# Patient Record
Sex: FEMALE | Race: BLACK OR AFRICAN AMERICAN | NOT HISPANIC OR LATINO | URBAN - METROPOLITAN AREA
[De-identification: names, ages, dates, MRNs, and addresses within clinical notes are randomized per-mention and may not be internally consistent; named-entity substitution may affect disease eponyms.]

---

## 2021-04-19 RX ORDER — CHLORHEXIDINE GLUCONATE 213 G/1000ML
1 SOLUTION TOPICAL ONCE
Refills: 0 | Status: DISCONTINUED | OUTPATIENT
Start: 2021-04-21 | End: 2021-05-06

## 2021-04-19 NOTE — H&P ADULT - ASSESSMENT
Risks & benefits of procedure and alternative therapy have been explained to the patient including but not limited to: allergic reaction, bleeding w/possible need for blood transfusion, infection, renal and vascular compromise, limb damage, arrhythmia, stroke, vessel dissection/perforation, Myocardial infarction, emergent CABG. Informed consent obtained and in chart.     Pt is 80yo F w/ PMHx HTN, HLD, hx TIA (R eye drooping, no other residual deficits), Sick sinus syndrome (s/p PPM placement 12/2020 w/ Dr. Ozzie Kong; [employer]), PAD (severe tibial disease per MD note from 3/2021 – plan to investigate further in future after cardiac angiogram) who presented to her cardiologist, Dr. Dasha Garcia, endorsing substernal, non-radiating, aching CP that is exertional in nature, not at rest, exacerbated by moderate physical exertion. She also has B/L LE claudication symptoms w/ walking 2-3 blocks or 1-2 flights of stairs. NST (10/26/20): fixed moderate perfusion abnormality of moderate intensity in inferior region; Reversible moderate perfusion abnormality of moderate intensity in apical region. ECHO (10/3/20): LVEF 63%, mild MR, mild TR. Pt no presents for cardiac cath.    Denies bleeding, hematuria, hematochezia, melena. Aspirin 325mg and Plavix 600mg ordered pre-cath.   Euvolemic, normal EF. NS @ 75cc/hour x 4h ordered pre-cath.   NO ESCORT. NO SEDATION    Mallampati Class IV  ASA Class II  Pt is a suitable candidate for moderate sedation.   Risks & benefits of procedure and alternative therapy have been explained to the patient including but not limited to: allergic reaction, bleeding w/possible need for blood transfusion, infection, renal and vascular compromise, limb damage, arrhythmia, stroke, vessel dissection/perforation, Myocardial infarction, emergent CABG. Informed consent obtained and in chart.

## 2021-04-19 NOTE — H&P ADULT - HISTORY OF PRESENT ILLNESS
***** SKELETON *****        CARDIOLOGIST: Dr. Garcia  COVID: [facility] cardiology  PHARMACY:   ESCORT:     Pt is 80yo F w/ PMHx ____ HTN, HLD, Sick sinus syndrome (s/p PPM placement 12/2020 w/ Dr. Ozzie Knog; [employer]), PAD (severe tibial disease per MD note from 3/2021 – plan to investigate further in future after cardiac angiogram) who presented to her cardiologist, Dr. Dasha Garcia, endorsing substernal, non-radiating, aching CP that is exertional in nature, not at rest, exacerbated by moderate physical exertion. She also has B/L LE claudication symptoms w/ walking 2-3 blocks or 1-2 flights of stairs. Pt denies palpitations, dizziness, syncope, orthopnea, PND, LE edema, N/V, abdominal pain, fever/chills.      NST (10/26/20): fixed moderate perfusion abnormality of moderate intensity in inferior region; Reversible moderate perfusion abnormality of moderate intensity in apical region. ECHO (10/3/20): LVEF 63%, mild MR, mild TR.     In light of patient’s risk factors, CCS Class II Anginal symptoms, and abnormal NST, patient now presents for cardiac catheterization with possible intervention if clinically indicated.    CARDIOLOGIST: Dr. Garcia  COVID: [facility] cardiology  PHARMACY:   ESCORT:     Pt is 80yo F w/ PMHx HTN, HLD, hx TIA (R eye drooping, no other residual deficits), Sick sinus syndrome (s/p PPM placement 12/2020 w/ Dr. Ozzie Kong; [employer]), PAD (severe tibial disease per MD note from 3/2021 – plan to investigate further in future after cardiac angiogram) who presented to her cardiologist, Dr. Dasha Garcia, endorsing substernal, non-radiating, aching CP that is exertional in nature, not at rest, exacerbated by moderate physical exertion. She also has B/L LE claudication symptoms w/ walking 2-3 blocks or 1-2 flights of stairs. Pt denies palpitations, dizziness, syncope, orthopnea, PND, LE edema, N/V, abdominal pain, fever/chills.      NST (10/26/20): fixed moderate perfusion abnormality of moderate intensity in inferior region; Reversible moderate perfusion abnormality of moderate intensity in apical region. ECHO (10/3/20): LVEF 63%, mild MR, mild TR.     In light of patient’s risk factors, CCS Class II Anginal symptoms, and abnormal NST, patient now presents for cardiac catheterization with possible intervention if clinically indicated.

## 2021-04-21 ENCOUNTER — OUTPATIENT (OUTPATIENT)
Dept: OUTPATIENT SERVICES | Facility: HOSPITAL | Age: 81
LOS: 1 days | Discharge: HOME | End: 2021-04-21
Payer: COMMERCIAL

## 2021-04-21 LAB
A1C WITH ESTIMATED AVERAGE GLUCOSE RESULT: 6.5 % — HIGH (ref 4–5.6)
ALBUMIN SERPL ELPH-MCNC: 3.9 G/DL — SIGNIFICANT CHANGE UP (ref 3.3–5)
ALP SERPL-CCNC: 141 U/L — HIGH (ref 40–120)
ALT FLD-CCNC: 29 U/L — SIGNIFICANT CHANGE UP (ref 10–45)
ANION GAP SERPL CALC-SCNC: 7 MMOL/L — SIGNIFICANT CHANGE UP (ref 5–17)
APTT BLD: 28.4 SEC — SIGNIFICANT CHANGE UP (ref 27.5–35.5)
AST SERPL-CCNC: 22 U/L — SIGNIFICANT CHANGE UP (ref 10–40)
BASOPHILS # BLD AUTO: 0.04 K/UL — SIGNIFICANT CHANGE UP (ref 0–0.2)
BASOPHILS NFR BLD AUTO: 0.6 % — SIGNIFICANT CHANGE UP (ref 0–2)
BILIRUB SERPL-MCNC: 0.5 MG/DL — SIGNIFICANT CHANGE UP (ref 0.2–1.2)
BUN SERPL-MCNC: 18 MG/DL — SIGNIFICANT CHANGE UP (ref 7–23)
CALCIUM SERPL-MCNC: 9.4 MG/DL — SIGNIFICANT CHANGE UP (ref 8.4–10.5)
CHLORIDE SERPL-SCNC: 106 MMOL/L — SIGNIFICANT CHANGE UP (ref 96–108)
CHOLEST SERPL-MCNC: 150 MG/DL — SIGNIFICANT CHANGE UP
CK MB CFR SERPL CALC: 1.1 NG/ML — SIGNIFICANT CHANGE UP (ref 0–6.7)
CK SERPL-CCNC: 70 U/L — SIGNIFICANT CHANGE UP (ref 25–170)
CO2 SERPL-SCNC: 30 MMOL/L — SIGNIFICANT CHANGE UP (ref 22–31)
CREAT SERPL-MCNC: 0.75 MG/DL — SIGNIFICANT CHANGE UP (ref 0.5–1.3)
EOSINOPHIL # BLD AUTO: 0.14 K/UL — SIGNIFICANT CHANGE UP (ref 0–0.5)
EOSINOPHIL NFR BLD AUTO: 2 % — SIGNIFICANT CHANGE UP (ref 0–6)
ESTIMATED AVERAGE GLUCOSE: 140 MG/DL — HIGH (ref 68–114)
GLUCOSE SERPL-MCNC: 130 MG/DL — HIGH (ref 70–99)
HCT VFR BLD CALC: 35.7 % — SIGNIFICANT CHANGE UP (ref 34.5–45)
HDLC SERPL-MCNC: 70 MG/DL — SIGNIFICANT CHANGE UP
HGB BLD-MCNC: 11.4 G/DL — LOW (ref 11.5–15.5)
IMM GRANULOCYTES NFR BLD AUTO: 0.1 % — SIGNIFICANT CHANGE UP (ref 0–1.5)
INR BLD: 0.95 — SIGNIFICANT CHANGE UP (ref 0.88–1.16)
LIPID PNL WITH DIRECT LDL SERPL: 57 MG/DL — SIGNIFICANT CHANGE UP
LYMPHOCYTES # BLD AUTO: 3.91 K/UL — HIGH (ref 1–3.3)
LYMPHOCYTES # BLD AUTO: 54.6 % — HIGH (ref 13–44)
MCHC RBC-ENTMCNC: 31.9 GM/DL — LOW (ref 32–36)
MCHC RBC-ENTMCNC: 33.3 PG — SIGNIFICANT CHANGE UP (ref 27–34)
MCV RBC AUTO: 104.4 FL — HIGH (ref 80–100)
MONOCYTES # BLD AUTO: 0.52 K/UL — SIGNIFICANT CHANGE UP (ref 0–0.9)
MONOCYTES NFR BLD AUTO: 7.3 % — SIGNIFICANT CHANGE UP (ref 2–14)
NEUTROPHILS # BLD AUTO: 2.54 K/UL — SIGNIFICANT CHANGE UP (ref 1.8–7.4)
NEUTROPHILS NFR BLD AUTO: 35.4 % — LOW (ref 43–77)
NON HDL CHOLESTEROL: 80 MG/DL — SIGNIFICANT CHANGE UP
NRBC # BLD: 0 /100 WBCS — SIGNIFICANT CHANGE UP (ref 0–0)
PLATELET # BLD AUTO: 229 K/UL — SIGNIFICANT CHANGE UP (ref 150–400)
POTASSIUM SERPL-MCNC: 4.3 MMOL/L — SIGNIFICANT CHANGE UP (ref 3.5–5.3)
POTASSIUM SERPL-SCNC: 4.3 MMOL/L — SIGNIFICANT CHANGE UP (ref 3.5–5.3)
PROT SERPL-MCNC: 7.5 G/DL — SIGNIFICANT CHANGE UP (ref 6–8.3)
PROTHROM AB SERPL-ACNC: 11.4 SEC — SIGNIFICANT CHANGE UP (ref 10.6–13.6)
RBC # BLD: 3.42 M/UL — LOW (ref 3.8–5.2)
RBC # FLD: 11.7 % — SIGNIFICANT CHANGE UP (ref 10.3–14.5)
SODIUM SERPL-SCNC: 143 MMOL/L — SIGNIFICANT CHANGE UP (ref 135–145)
TRIGL SERPL-MCNC: 113 MG/DL — SIGNIFICANT CHANGE UP
WBC # BLD: 7.16 K/UL — SIGNIFICANT CHANGE UP (ref 3.8–10.5)
WBC # FLD AUTO: 7.16 K/UL — SIGNIFICANT CHANGE UP (ref 3.8–10.5)

## 2021-04-21 PROCEDURE — C1769: CPT

## 2021-04-21 PROCEDURE — 93005 ELECTROCARDIOGRAM TRACING: CPT

## 2021-04-21 PROCEDURE — 93454 CORONARY ARTERY ANGIO S&I: CPT

## 2021-04-21 PROCEDURE — 83036 HEMOGLOBIN GLYCOSYLATED A1C: CPT

## 2021-04-21 PROCEDURE — 85025 COMPLETE CBC W/AUTO DIFF WBC: CPT

## 2021-04-21 PROCEDURE — 80053 COMPREHEN METABOLIC PANEL: CPT

## 2021-04-21 PROCEDURE — 93010 ELECTROCARDIOGRAM REPORT: CPT

## 2021-04-21 PROCEDURE — 82553 CREATINE MB FRACTION: CPT

## 2021-04-21 PROCEDURE — 85730 THROMBOPLASTIN TIME PARTIAL: CPT

## 2021-04-21 PROCEDURE — C1894: CPT

## 2021-04-21 PROCEDURE — C1887: CPT

## 2021-04-21 PROCEDURE — 80061 LIPID PANEL: CPT

## 2021-04-21 PROCEDURE — 82550 ASSAY OF CK (CPK): CPT

## 2021-04-21 PROCEDURE — 85610 PROTHROMBIN TIME: CPT

## 2021-04-21 RX ORDER — SODIUM CHLORIDE 9 MG/ML
1000 INJECTION, SOLUTION INTRAVENOUS
Refills: 0 | Status: DISCONTINUED | OUTPATIENT
Start: 2021-04-21 | End: 2021-05-06

## 2021-04-21 RX ORDER — SODIUM CHLORIDE 9 MG/ML
500 INJECTION INTRAMUSCULAR; INTRAVENOUS; SUBCUTANEOUS
Refills: 0 | Status: DISCONTINUED | OUTPATIENT
Start: 2021-04-21 | End: 2021-05-06

## 2021-04-21 RX ORDER — AMLODIPINE BESYLATE 2.5 MG/1
5 TABLET ORAL ONCE
Refills: 0 | Status: COMPLETED | OUTPATIENT
Start: 2021-04-21 | End: 2021-04-21

## 2021-04-21 RX ORDER — ASPIRIN/CALCIUM CARB/MAGNESIUM 324 MG
325 TABLET ORAL ONCE
Refills: 0 | Status: COMPLETED | OUTPATIENT
Start: 2021-04-21 | End: 2021-04-21

## 2021-04-21 RX ORDER — AMLODIPINE BESYLATE 2.5 MG/1
1 TABLET ORAL
Qty: 0 | Refills: 0 | DISCHARGE

## 2021-04-21 RX ORDER — DEXTROSE 50 % IN WATER 50 %
12.5 SYRINGE (ML) INTRAVENOUS ONCE
Refills: 0 | Status: DISCONTINUED | OUTPATIENT
Start: 2021-04-21 | End: 2021-05-06

## 2021-04-21 RX ORDER — CLOPIDOGREL BISULFATE 75 MG/1
600 TABLET, FILM COATED ORAL ONCE
Refills: 0 | Status: COMPLETED | OUTPATIENT
Start: 2021-04-21 | End: 2021-04-21

## 2021-04-21 RX ORDER — GLUCAGON INJECTION, SOLUTION 0.5 MG/.1ML
1 INJECTION, SOLUTION SUBCUTANEOUS ONCE
Refills: 0 | Status: DISCONTINUED | OUTPATIENT
Start: 2021-04-21 | End: 2021-05-06

## 2021-04-21 RX ORDER — ASPIRIN/CALCIUM CARB/MAGNESIUM 324 MG
1 TABLET ORAL
Qty: 30 | Refills: 3
Start: 2021-04-21 | End: 2021-08-18

## 2021-04-21 RX ORDER — DEXTROSE 50 % IN WATER 50 %
25 SYRINGE (ML) INTRAVENOUS ONCE
Refills: 0 | Status: DISCONTINUED | OUTPATIENT
Start: 2021-04-21 | End: 2021-05-06

## 2021-04-21 RX ORDER — ASPIRIN/CALCIUM CARB/MAGNESIUM 324 MG
1 TABLET ORAL
Qty: 0 | Refills: 0 | DISCHARGE

## 2021-04-21 RX ORDER — OMEPRAZOLE 10 MG/1
1 CAPSULE, DELAYED RELEASE ORAL
Qty: 0 | Refills: 0 | DISCHARGE

## 2021-04-21 RX ORDER — ATORVASTATIN CALCIUM 80 MG/1
1 TABLET, FILM COATED ORAL
Qty: 0 | Refills: 0 | DISCHARGE

## 2021-04-21 RX ORDER — DORZOLAMIDE HYDROCHLORIDE TIMOLOL MALEATE 20; 5 MG/ML; MG/ML
1 SOLUTION/ DROPS OPHTHALMIC
Qty: 0 | Refills: 0 | DISCHARGE

## 2021-04-21 RX ORDER — LOSARTAN POTASSIUM 100 MG/1
1 TABLET, FILM COATED ORAL
Qty: 0 | Refills: 0 | DISCHARGE

## 2021-04-21 RX ORDER — DEXTROSE 50 % IN WATER 50 %
15 SYRINGE (ML) INTRAVENOUS ONCE
Refills: 0 | Status: DISCONTINUED | OUTPATIENT
Start: 2021-04-21 | End: 2021-05-06

## 2021-04-21 RX ORDER — LATANOPROST 0.05 MG/ML
1 SOLUTION/ DROPS OPHTHALMIC; TOPICAL
Qty: 0 | Refills: 0 | DISCHARGE

## 2021-04-21 RX ORDER — INSULIN LISPRO 100/ML
VIAL (ML) SUBCUTANEOUS ONCE
Refills: 0 | Status: DISCONTINUED | OUTPATIENT
Start: 2021-04-21 | End: 2021-05-06

## 2021-04-21 RX ORDER — BRIMONIDINE TARTRATE 2 MG/MG
1 SOLUTION/ DROPS OPHTHALMIC
Qty: 0 | Refills: 0 | DISCHARGE

## 2021-04-21 RX ADMIN — AMLODIPINE BESYLATE 5 MILLIGRAM(S): 2.5 TABLET ORAL at 16:36

## 2021-04-21 RX ADMIN — SODIUM CHLORIDE 75 MILLILITER(S): 9 INJECTION INTRAMUSCULAR; INTRAVENOUS; SUBCUTANEOUS at 14:10

## 2021-04-21 RX ADMIN — Medication 325 MILLIGRAM(S): at 14:09

## 2021-04-21 RX ADMIN — CLOPIDOGREL BISULFATE 600 MILLIGRAM(S): 75 TABLET, FILM COATED ORAL at 14:09

## 2021-04-21 NOTE — PROGRESS NOTE ADULT - SUBJECTIVE AND OBJECTIVE BOX
Interventional Cardiology PA SDA Discharge Note    Patient without complaints. Ambulated and voided without difficulties    Afebrile, VSS    Ext:    		Right/Left             Groin:       hematoma,     bruit, dressing; C/D/I  		        Right/Left              Radial :    hematoma,     bleeding, dressing; C/D/I      Pulses:    intact RAD/DP/PT to baseline     A/P:        Pt is 80yo F w/ PMHx HTN, HLD, hx TIA (R eye drooping, no other residual deficits), Sick sinus syndrome (s/p PPM placement 12/2020 w/ Dr. Ozzie Kong; [employer]), PAD (severe tibial disease per MD note from 3/2021 – plan to investigate further in future after cardiac angiogram) who presented to her cardiologist, Dr. Dasha Garcia, endorsing substernal, non-radiating, aching CP that is exertional in nature, not at rest, exacerbated by moderate physical exertion. She also has B/L LE claudication symptoms w/ walking 2-3 blocks or 1-2 flights of stairs. Pt denies palpitations, dizziness, syncope, orthopnea, PND, LE edema, N/V, abdominal pain, fever/chills.    s/p cardiac cath on 4/21/21 revealing R dominant, LM normla, mild luminal irregularities, LCx mild luminal, RCA mild luminal, EDP 10mmHG, Continue medical managment of nonobstructive CAD        1.	Stable for discharge as per attending Dr. Ambrose after bed rest, pt voids, groin/wrist stable and 30 minutes of ambulation.  2.	Follow-up with PMD/Cardiologist Dr. Kong in 1-2 weeks  3.	Discharged forms signed and copies in chart      Interventional Cardiology PA SDA Discharge Note    Patient without complaints. Ambulated and voided without difficulties    Afebrile, VSS    Ext:    		Right/Left             Groin:       hematoma,     bruit, dressing; C/D/I  		        Right/Left              Radial :    hematoma,     bleeding, dressing; C/D/I      Pulses:    intact RAD/DP/PT to baseline     A/P:        Pt is 82yo F w/ PMHx HTN, HLD, hx TIA (R eye drooping, no other residual deficits), Sick sinus syndrome (s/p PPM placement 12/2020 w/ Dr. Ozzie Kong; [employer]), PAD (severe tibial disease per MD note from 3/2021 – plan to investigate further in future after cardiac angiogram) who presented to her cardiologist, Dr. Dasha Garcia, endorsing substernal, non-radiating, aching CP that is exertional in nature, not at rest, exacerbated by moderate physical exertion. She also has B/L LE claudication symptoms w/ walking 2-3 blocks or 1-2 flights of stairs. Pt denies palpitations, dizziness, syncope, orthopnea, PND, LE edema, N/V, abdominal pain, fever/chills.    s/p cardiac cath on 4/21/21 revealing R dominant, LM normla, mild luminal irregularities, LCx mild luminal, RCA mild luminal, EDP 10mmHG, Continue medical managment of nonobstructive CAD    Sent Rx for Aspirin 81mg to patients preferred pharmacy       1.	Stable for discharge as per attending Dr. Ambrose after bed rest, pt voids, groin/wrist stable and 30 minutes of ambulation.  2.	Follow-up with PMD/Cardiologist Dr. Kong in 1-2 weeks  3.	Discharged forms signed and copies in chart

## 2021-04-27 DIAGNOSIS — I25.110 ATHEROSCLEROTIC HEART DISEASE OF NATIVE CORONARY ARTERY WITH UNSTABLE ANGINA PECTORIS: ICD-10-CM

## 2021-04-27 DIAGNOSIS — I10 ESSENTIAL (PRIMARY) HYPERTENSION: ICD-10-CM

## 2021-04-27 DIAGNOSIS — I73.9 PERIPHERAL VASCULAR DISEASE, UNSPECIFIED: ICD-10-CM

## 2021-04-27 DIAGNOSIS — E78.5 HYPERLIPIDEMIA, UNSPECIFIED: ICD-10-CM

## 2021-04-27 DIAGNOSIS — R94.39 ABNORMAL RESULT OF OTHER CARDIOVASCULAR FUNCTION STUDY: ICD-10-CM

## 2025-05-02 VITALS
OXYGEN SATURATION: 100 % | SYSTOLIC BLOOD PRESSURE: 164 MMHG | RESPIRATION RATE: 18 BRPM | WEIGHT: 190.04 LBS | DIASTOLIC BLOOD PRESSURE: 75 MMHG | HEART RATE: 60 BPM

## 2025-05-02 RX ORDER — RANOLAZINE 1000 MG/1
1 TABLET, FILM COATED, EXTENDED RELEASE ORAL
Refills: 0 | DISCHARGE

## 2025-05-02 NOTE — H&P ADULT - NSICDXPASTMEDICALHX_GEN_ALL_CORE_FT
PAST MEDICAL HISTORY:  Hyperlipidemia     Hypertension     Peripheral artery disease     Sick sinus syndrome

## 2025-05-02 NOTE — H&P ADULT - ASSESSMENT
84yo F with PMHx of HTN, HLD, CAD (non-obstructive cath 2021 @ [facility], PAD (severe tibial disease, occluded L PTA and VANGIE), SSS (w/ 2:1 AV block, s/p [employer] PPM), T1 AVB who presents for [facility] with possible intervention due to patient's risk factors, CCS Class III symptoms, and abnormal NST.       - EKG:   Vpaced at 78 bpm   - ASA:   III        Mallampati: II  - H/H stable:  11.2/34.0      Platelets/Coags stable.   - Patient denies hematochieza, hematuria, easy bruising, or signs of bleeding.   - Patient took her Aspirin 81 mg PO x 1 this morning. She reports compliance with Aspirin daily. She was loaded with Plavix 600 mg PO x 1   - Patient started on IV  cc bolus x 1 and started on IV NS 75 cc/hr x 4 hours   - Patient is a suitable candidate for moderate sedation.     Risks & benefits of procedure and alternative therapy have been explained to the patient including but not limited to: allergic reaction, bleeding w/possible need for blood transfusion, infection, renal and vascular compromise, limb damage, arrhythmia, stroke, vessel dissection/perforation, Myocardial infarction, emergent CABG. Informed consent obtained and in chart.

## 2025-05-02 NOTE — H&P ADULT - HISTORY OF PRESENT ILLNESS
Cardiologist:  Pharmacy:  Escort:    __yo M/F with PMHX of HTN/HLD/CAD/DM presented to their outpatient cardiologist ____ complaining of intermittent substernal CP/ SOB occurring w/ mod physical exertion, relieved with rest. Pt denies dizziness, palpitations, orthopnea/PND, leg swelling, LOC, bleeding, melena/hematochezia, fever, chills, URI symptoms, or recent illness.      In light of pts risk factors, CCS class _ anginal symptoms and abnormal NST, pt now presents to [facility] for recommended cardiac catheterization with possible intervention if clinically indicated.      Cardiac Testing:   TTE:  NST/CCTA:  Prior Cath:     Cardiologist: Dr. Garcia  Pharmacy:  Escort:    86yo F with PMHx of HTN, HLD, CAD (non-obstructive cath 2021 @ [facility], PAD (severe tibial disease, occluded L PTA and VANGIE), SSS (w/ 2:1 AV block, s/p [employer] PPM), T1 AVB presented to their outpatient cardiologist Dr. Garcia complaining of intermittent substernal sharp CP with associated fatigue and SOB occurring with exertion (cleaning the house and getting groceries). Pt denies dizziness, palpitations, orthopnea/PND, leg swelling, LOC, bleeding, melena/hematochezia, fever, chills, URI symptoms, or recent illness.      In light of pts risk factors, CCS class _ anginal symptoms and abnormal NST, pt now presents to [facility] for recommended cardiac catheterization with possible intervention if clinically indicated.      Cardiac Testing:   TTE (4/19/24): LVEF 63%, mild TR  NST (3/24/25): abnormal myocardial perfusion imaging, mod amount of ischemia in inferior and inferoapical locations, LVEF 59%  LHC (4/19/21): R dominant, LM normal, mild luminal irregularities, LCx mild luminal, RCA mild luminal, EDP 10mmHG Cardiologist: Dr. Garcia  Pharmacy: [facility]  Escort: No ESCORT     86yo F with PMHx of HTN, HLD, CAD (non-obstructive cath 2021 @ [facility], PAD (severe tibial disease, occluded L PTA and VANGIE), SSS (w/ 2:1 AV block, s/p [employer] PPM), T1 AVB presented to their outpatient cardiologist Dr. Garcia complaining of intermittent substernal sharp CP with associated fatigue and SOB occurring with exertion (cleaning the house and getting groceries). Pt denies dizziness, palpitations, orthopnea/PND, leg swelling, LOC, bleeding, melena/hematochezia, fever, chills, URI symptoms, or recent illness.      In light of pts risk factors, CCS class III anginal symptoms and abnormal NST, pt now presents to [facility] for recommended cardiac catheterization with possible intervention if clinically indicated.      Cardiac Testing:   TTE (4/19/24): LVEF 63%, mild TR  NST (3/24/25): abnormal myocardial perfusion imaging, mod amount of ischemia in inferior and inferoapical locations, LVEF 59%  LHC (4/19/21): R dominant, LM normal, mild luminal irregularities, LCx mild luminal, RCA mild luminal, EDP 10mmHG

## 2025-05-21 ENCOUNTER — OUTPATIENT (OUTPATIENT)
Dept: OUTPATIENT SERVICES | Facility: HOSPITAL | Age: 85
LOS: 1 days | End: 2025-05-21
Payer: MEDICARE

## 2025-05-21 LAB
A1C WITH ESTIMATED AVERAGE GLUCOSE RESULT: 6.2 % — HIGH (ref 4–5.6)
ALBUMIN SERPL ELPH-MCNC: 3.8 G/DL — SIGNIFICANT CHANGE UP (ref 3.3–5)
ALP SERPL-CCNC: 185 U/L — HIGH (ref 40–120)
ALT FLD-CCNC: 38 U/L — SIGNIFICANT CHANGE UP (ref 10–45)
ANION GAP SERPL CALC-SCNC: 10 MMOL/L — SIGNIFICANT CHANGE UP (ref 5–17)
APTT BLD: 29.3 SEC — SIGNIFICANT CHANGE UP (ref 26.1–36.8)
AST SERPL-CCNC: 31 U/L — SIGNIFICANT CHANGE UP (ref 10–40)
BASOPHILS # BLD AUTO: 0.04 K/UL — SIGNIFICANT CHANGE UP (ref 0–0.2)
BASOPHILS NFR BLD AUTO: 0.6 % — SIGNIFICANT CHANGE UP (ref 0–2)
BILIRUB SERPL-MCNC: 0.4 MG/DL — SIGNIFICANT CHANGE UP (ref 0.2–1.2)
BUN SERPL-MCNC: 22 MG/DL — SIGNIFICANT CHANGE UP (ref 7–23)
CALCIUM SERPL-MCNC: 9.8 MG/DL — SIGNIFICANT CHANGE UP (ref 8.4–10.5)
CHLORIDE SERPL-SCNC: 105 MMOL/L — SIGNIFICANT CHANGE UP (ref 96–108)
CHOLEST SERPL-MCNC: 119 MG/DL — SIGNIFICANT CHANGE UP
CK MB CFR SERPL CALC: 1 NG/ML — SIGNIFICANT CHANGE UP (ref 0–6.7)
CK SERPL-CCNC: 37 U/L — SIGNIFICANT CHANGE UP (ref 25–170)
CO2 SERPL-SCNC: 27 MMOL/L — SIGNIFICANT CHANGE UP (ref 22–31)
CREAT SERPL-MCNC: 0.95 MG/DL — SIGNIFICANT CHANGE UP (ref 0.5–1.3)
EGFR: 58 ML/MIN/1.73M2 — LOW
EGFR: 58 ML/MIN/1.73M2 — LOW
EOSINOPHIL # BLD AUTO: 0.2 K/UL — SIGNIFICANT CHANGE UP (ref 0–0.5)
EOSINOPHIL NFR BLD AUTO: 2.9 % — SIGNIFICANT CHANGE UP (ref 0–6)
ESTIMATED AVERAGE GLUCOSE: 131 MG/DL — HIGH (ref 68–114)
GLUCOSE SERPL-MCNC: 152 MG/DL — HIGH (ref 70–99)
HCT VFR BLD CALC: 34 % — LOW (ref 34.5–45)
HDLC SERPL-MCNC: 72 MG/DL — SIGNIFICANT CHANGE UP
HGB BLD-MCNC: 11.2 G/DL — LOW (ref 11.5–15.5)
IMM GRANULOCYTES NFR BLD AUTO: 0.1 % — SIGNIFICANT CHANGE UP (ref 0–0.9)
INR BLD: 0.96 — SIGNIFICANT CHANGE UP (ref 0.85–1.16)
LDLC SERPL-MCNC: 33 MG/DL — SIGNIFICANT CHANGE UP
LIPID PNL WITH DIRECT LDL SERPL: 33 MG/DL — SIGNIFICANT CHANGE UP
LYMPHOCYTES # BLD AUTO: 3.32 K/UL — HIGH (ref 1–3.3)
LYMPHOCYTES # BLD AUTO: 48.5 % — HIGH (ref 13–44)
MAGNESIUM SERPL-MCNC: 2.2 MG/DL — SIGNIFICANT CHANGE UP (ref 1.6–2.6)
MCHC RBC-ENTMCNC: 32.9 G/DL — SIGNIFICANT CHANGE UP (ref 32–36)
MCHC RBC-ENTMCNC: 33.7 PG — SIGNIFICANT CHANGE UP (ref 27–34)
MCV RBC AUTO: 102.4 FL — HIGH (ref 80–100)
MONOCYTES # BLD AUTO: 0.49 K/UL — SIGNIFICANT CHANGE UP (ref 0–0.9)
MONOCYTES NFR BLD AUTO: 7.2 % — SIGNIFICANT CHANGE UP (ref 2–14)
NEUTROPHILS # BLD AUTO: 2.78 K/UL — SIGNIFICANT CHANGE UP (ref 1.8–7.4)
NEUTROPHILS NFR BLD AUTO: 40.7 % — LOW (ref 43–77)
NONHDLC SERPL-MCNC: 47 MG/DL — SIGNIFICANT CHANGE UP
NRBC BLD AUTO-RTO: 0 /100 WBCS — SIGNIFICANT CHANGE UP (ref 0–0)
PLATELET # BLD AUTO: 251 K/UL — SIGNIFICANT CHANGE UP (ref 150–400)
POTASSIUM SERPL-MCNC: 4.5 MMOL/L — SIGNIFICANT CHANGE UP (ref 3.5–5.3)
POTASSIUM SERPL-SCNC: 4.5 MMOL/L — SIGNIFICANT CHANGE UP (ref 3.5–5.3)
PROT SERPL-MCNC: 7.9 G/DL — SIGNIFICANT CHANGE UP (ref 6–8.3)
PROTHROM AB SERPL-ACNC: 11.1 SEC — SIGNIFICANT CHANGE UP (ref 9.9–13.4)
RBC # BLD: 3.32 M/UL — LOW (ref 3.8–5.2)
RBC # FLD: 11.9 % — SIGNIFICANT CHANGE UP (ref 10.3–14.5)
SODIUM SERPL-SCNC: 142 MMOL/L — SIGNIFICANT CHANGE UP (ref 135–145)
TRIGL SERPL-MCNC: 68 MG/DL — SIGNIFICANT CHANGE UP
WBC # BLD: 6.84 K/UL — SIGNIFICANT CHANGE UP (ref 3.8–10.5)
WBC # FLD AUTO: 6.84 K/UL — SIGNIFICANT CHANGE UP (ref 3.8–10.5)

## 2025-05-21 PROCEDURE — 80053 COMPREHEN METABOLIC PANEL: CPT

## 2025-05-21 PROCEDURE — C1894: CPT

## 2025-05-21 PROCEDURE — 85730 THROMBOPLASTIN TIME PARTIAL: CPT

## 2025-05-21 PROCEDURE — 85025 COMPLETE CBC W/AUTO DIFF WBC: CPT

## 2025-05-21 PROCEDURE — 93454 CORONARY ARTERY ANGIO S&I: CPT | Mod: 26

## 2025-05-21 PROCEDURE — 85610 PROTHROMBIN TIME: CPT

## 2025-05-21 PROCEDURE — 83036 HEMOGLOBIN GLYCOSYLATED A1C: CPT

## 2025-05-21 PROCEDURE — 93454 CORONARY ARTERY ANGIO S&I: CPT

## 2025-05-21 PROCEDURE — 82553 CREATINE MB FRACTION: CPT

## 2025-05-21 PROCEDURE — 99152 MOD SED SAME PHYS/QHP 5/>YRS: CPT

## 2025-05-21 PROCEDURE — 82550 ASSAY OF CK (CPK): CPT

## 2025-05-21 PROCEDURE — C1769: CPT

## 2025-05-21 PROCEDURE — C1887: CPT

## 2025-05-21 PROCEDURE — 80061 LIPID PANEL: CPT

## 2025-05-21 PROCEDURE — 83735 ASSAY OF MAGNESIUM: CPT

## 2025-05-21 RX ORDER — RANOLAZINE 1000 MG/1
1 TABLET, FILM COATED, EXTENDED RELEASE ORAL
Refills: 0 | DISCHARGE

## 2025-05-21 RX ORDER — DOCUSATE SODIUM 100 MG
1 CAPSULE ORAL
Refills: 0 | DISCHARGE

## 2025-05-21 RX ORDER — ASPIRIN 325 MG
81 TABLET ORAL ONCE
Refills: 0 | Status: DISCONTINUED | OUTPATIENT
Start: 2025-05-21 | End: 2025-05-21

## 2025-05-21 RX ORDER — CLOPIDOGREL BISULFATE 75 MG/1
600 TABLET, FILM COATED ORAL ONCE
Refills: 0 | Status: COMPLETED | OUTPATIENT
Start: 2025-05-21 | End: 2025-05-21

## 2025-05-21 RX ADMIN — Medication 240 MILLILITER(S): at 16:34

## 2025-05-21 RX ADMIN — CLOPIDOGREL BISULFATE 600 MILLIGRAM(S): 75 TABLET, FILM COATED ORAL at 14:46

## 2025-05-21 RX ADMIN — Medication 75 MILLILITER(S): at 14:28

## 2025-05-21 RX ADMIN — Medication 500 MILLILITER(S): at 14:28

## 2025-05-21 NOTE — PROGRESS NOTE ADULT - SUBJECTIVE AND OBJECTIVE BOX
Interventional Cardiology  Post  Diagnostic Catheterization  Discharge Note      Patient without complaints. Ambulated and voided without difficulties    Afebrile, VSS    Ext:    		   		Right             Radial :  no  hematoma,     bleeding, dressing; C/D/I      Pulses:    intact RAD/DP/PT to baseline     A/P:  84yo F with PMHx of HTN, HLD, CAD (non-obstructive cath 2021 @ [facility], PAD (severe tibial disease, occluded L PTA and VANGIE), SSS (w/ 2:1 AV block, s/p [employer] PPM), T1 AVB who presents for [facility] with possible intervention due to patient's risk factors, CCS Class III symptoms, and abnormal NST.     s/p LHC 5/21/25: Right Dominant. LM- Normal, All vessels- MLI. Access: Right radial. (ONEIL Dominguez/Slime)     1.	Follow-up with PMD/Cardiologist Radha in 1week.   2.                 Pt given instructions on importance of post groin/radial access site care. .    3. 	Stable for discharge as per attending Dr. Dominguez after bed rest, pt voids, groin/wrist stable and 30 minutes of ambulation.

## 2025-05-27 DIAGNOSIS — I25.110 ATHEROSCLEROTIC HEART DISEASE OF NATIVE CORONARY ARTERY WITH UNSTABLE ANGINA PECTORIS: ICD-10-CM

## 2025-05-27 DIAGNOSIS — R94.39 ABNORMAL RESULT OF OTHER CARDIOVASCULAR FUNCTION STUDY: ICD-10-CM
